# Patient Record
Sex: FEMALE | Employment: FULL TIME | URBAN - METROPOLITAN AREA
[De-identification: names, ages, dates, MRNs, and addresses within clinical notes are randomized per-mention and may not be internally consistent; named-entity substitution may affect disease eponyms.]

---

## 2023-05-18 ENCOUNTER — TELEPHONE (OUTPATIENT)
Dept: OTOLARYNGOLOGY | Facility: CLINIC | Age: 78
End: 2023-05-18
Payer: MEDICARE

## 2023-05-18 NOTE — TELEPHONE ENCOUNTER
They have been trying to fax the referral to us. 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 It is an urgent referral per the nurse.     Stacy Salomon LPN

## 2023-05-19 NOTE — TELEPHONE ENCOUNTER
Patient called the backline telephone to schedule her appointment. Patient has referral sent from Dr. Naqvi from the [facility] clinic in [place].    Patient was advised that we would review the referral and contact her with an appointment as soon as a nurse had the opportunity to review.    Patient verbalized understanding and is in agreement with plan.    Verbal follow up done with Yoko CAMARILLO care coordinator regarding patient's request.    NIKOLE PALACIOS LPN on 5/19/2023 at 11:21 AM

## 2023-05-22 ENCOUNTER — TELEPHONE (OUTPATIENT)
Dept: OTOLARYNGOLOGY | Facility: CLINIC | Age: 78
End: 2023-05-22
Payer: MEDICARE

## 2023-05-22 ENCOUNTER — TRANSCRIBE ORDERS (OUTPATIENT)
Dept: OTHER | Age: 78
End: 2023-05-22

## 2023-05-22 DIAGNOSIS — C06.2 SQUAMOUS CELL CARCINOMA OF RETROMOLAR TRIGONE (H): Primary | ICD-10-CM

## 2023-06-27 ENCOUNTER — LAB REQUISITION (OUTPATIENT)
Dept: LAB | Facility: CLINIC | Age: 78
End: 2023-06-27

## 2023-06-27 PROCEDURE — 88342 IMHCHEM/IMCYTCHM 1ST ANTB: CPT | Mod: TC | Performed by: SPECIALIST
